# Patient Record
Sex: FEMALE | Race: WHITE
[De-identification: names, ages, dates, MRNs, and addresses within clinical notes are randomized per-mention and may not be internally consistent; named-entity substitution may affect disease eponyms.]

---

## 2019-01-07 ENCOUNTER — HOSPITAL ENCOUNTER (OUTPATIENT)
Dept: HOSPITAL 75 - RAD | Age: 19
End: 2019-01-07
Attending: UROLOGY
Payer: COMMERCIAL

## 2019-01-07 DIAGNOSIS — N20.1: Primary | ICD-10-CM

## 2019-01-07 PROCEDURE — 74018 RADEX ABDOMEN 1 VIEW: CPT

## 2019-01-07 NOTE — DIAGNOSTIC IMAGING REPORT
INDICATION: Right ureteral stone.



TIME OF EXAMINATION: 12:56 p.m.



COMPARISON: No prior studies are available for comparison.



FINDINGS: No definite radiopaque renal calculi are seen. No

definite radiopaque calculi along the course of the ureters is

seen. The bowel gas pattern is unremarkable.



IMPRESSION: Unremarkable abdominal radiograph. No definite

radiopaque urinary tract calculi are identified.



Dictated by: 



  Dictated on workstation # [id number]

## 2020-09-02 ENCOUNTER — HOSPITAL ENCOUNTER (OUTPATIENT)
Dept: HOSPITAL 75 - RAD | Age: 20
End: 2020-09-02
Attending: UROLOGY
Payer: COMMERCIAL

## 2020-09-02 DIAGNOSIS — Z87.442: ICD-10-CM

## 2020-09-02 DIAGNOSIS — N20.1: Primary | ICD-10-CM

## 2020-09-02 PROCEDURE — 74018 RADEX ABDOMEN 1 VIEW: CPT

## 2020-09-02 NOTE — DIAGNOSTIC IMAGING REPORT
INDICATION: 

History of renal calculi. History of right flank pain.



COMPARISON: 

01/07/2019.



FINDINGS: 

Two supine radiographic views of the abdomen were obtained. No

unexpected extraosseous calcifications or radiopaque foreign

bodies are seen. The small bowel loops are nondistended. There is

no large collection of free intraperitoneal air. The included

portions of the lung bases are clear. The osseous structures show

no acute abnormalities.



IMPRESSION:

1. No unexpected extraosseous calcifications.

2. Nonobstructed small bowel gas pattern.



Dictated by: 



  Dictated on workstation # [id number]

## 2020-09-03 ENCOUNTER — HOSPITAL ENCOUNTER (OUTPATIENT)
Dept: HOSPITAL 75 - PREOP | Age: 20
End: 2020-09-03
Attending: UROLOGY
Payer: COMMERCIAL

## 2020-09-03 VITALS — WEIGHT: 293 LBS | BODY MASS INDEX: 41.02 KG/M2 | HEIGHT: 70.98 IN

## 2020-09-03 DIAGNOSIS — Z01.818: Primary | ICD-10-CM

## 2020-09-09 ENCOUNTER — HOSPITAL ENCOUNTER (OUTPATIENT)
Dept: HOSPITAL 75 - SDC | Age: 20
Discharge: HOME | End: 2020-09-09
Attending: UROLOGY
Payer: COMMERCIAL

## 2020-09-09 VITALS — SYSTOLIC BLOOD PRESSURE: 81 MMHG | DIASTOLIC BLOOD PRESSURE: 56 MMHG

## 2020-09-09 VITALS — DIASTOLIC BLOOD PRESSURE: 67 MMHG | SYSTOLIC BLOOD PRESSURE: 112 MMHG

## 2020-09-09 VITALS — SYSTOLIC BLOOD PRESSURE: 108 MMHG | DIASTOLIC BLOOD PRESSURE: 70 MMHG

## 2020-09-09 VITALS — DIASTOLIC BLOOD PRESSURE: 70 MMHG | SYSTOLIC BLOOD PRESSURE: 117 MMHG

## 2020-09-09 VITALS — DIASTOLIC BLOOD PRESSURE: 92 MMHG | SYSTOLIC BLOOD PRESSURE: 129 MMHG

## 2020-09-09 VITALS — DIASTOLIC BLOOD PRESSURE: 97 MMHG | SYSTOLIC BLOOD PRESSURE: 127 MMHG

## 2020-09-09 VITALS — DIASTOLIC BLOOD PRESSURE: 67 MMHG | SYSTOLIC BLOOD PRESSURE: 114 MMHG

## 2020-09-09 VITALS — DIASTOLIC BLOOD PRESSURE: 68 MMHG | SYSTOLIC BLOOD PRESSURE: 109 MMHG

## 2020-09-09 VITALS — DIASTOLIC BLOOD PRESSURE: 72 MMHG | SYSTOLIC BLOOD PRESSURE: 120 MMHG

## 2020-09-09 VITALS — SYSTOLIC BLOOD PRESSURE: 127 MMHG | DIASTOLIC BLOOD PRESSURE: 97 MMHG

## 2020-09-09 VITALS — WEIGHT: 293 LBS | BODY MASS INDEX: 41.02 KG/M2 | HEIGHT: 70.98 IN

## 2020-09-09 VITALS — SYSTOLIC BLOOD PRESSURE: 102 MMHG | DIASTOLIC BLOOD PRESSURE: 72 MMHG

## 2020-09-09 DIAGNOSIS — Z79.4: ICD-10-CM

## 2020-09-09 DIAGNOSIS — Z88.7: ICD-10-CM

## 2020-09-09 DIAGNOSIS — K21.9: ICD-10-CM

## 2020-09-09 DIAGNOSIS — E11.9: ICD-10-CM

## 2020-09-09 DIAGNOSIS — E66.01: ICD-10-CM

## 2020-09-09 DIAGNOSIS — N20.1: Primary | ICD-10-CM

## 2020-09-09 DIAGNOSIS — K76.0: ICD-10-CM

## 2020-09-09 PROCEDURE — 82962 GLUCOSE BLOOD TEST: CPT

## 2020-09-09 PROCEDURE — 87081 CULTURE SCREEN ONLY: CPT

## 2020-09-09 PROCEDURE — 74018 RADEX ABDOMEN 1 VIEW: CPT

## 2020-09-09 PROCEDURE — 76000 FLUOROSCOPY <1 HR PHYS/QHP: CPT

## 2020-09-09 PROCEDURE — 84703 CHORIONIC GONADOTROPIN ASSAY: CPT

## 2020-09-09 NOTE — DISCHARGE INST-UROLOGY
Discharge Inst-Urology


Reconcile Patient Problems


Problems Reviewed?:  Yes


Final Diagnosis


RT URETERAL STONE





Patient Instructions/Follow Up


Plan/Assessment/Instructions


Please make appointment to been seen in office in 2 weeks.





Increase oral fluids for 48 hours and then as needed.





Diet and Activity as tolerated.





If questions or concerns contact your physician 


Or seek help at emergency department.











TAWIL,ELIAS A MD                Sep 9, 2020 09:29

## 2020-09-09 NOTE — PROGRESS NOTE-PRE OPERATIVE
Pre-Operative Progress Note


H&P Reviewed


The H&P was reviewed, patient examined and no changes noted.


Date Seen by Provider:  Sep 9, 2020


Time Seen by Provider:  09:18


Date H&P Reviewed:  Sep 9, 2020


Time H&P Reviewed:  09:18


Pre-Operative Diagnosis:  RT URETERAL STONE











TAWIL,ELIAS A MD                Sep 9, 2020 07:18

## 2020-09-09 NOTE — DIAGNOSTIC IMAGING REPORT
INDICATION: Right renal stone.



Time of exam 8:55 AM



Correlation is made with prior KUB from 09/02/2020.



Mildly prominent small bowel loop in the left abdomen is noted,

nonspecific. No definite radiopaque urinary tract calculi are

detected. No free air is identified.



IMPRESSION: No definite radiopaque urinary tract calculi are

identified.



Dictated by: 



  Dictated on workstation # [id number]

## 2020-09-09 NOTE — OPERATIVE REPORT
DATE OF SERVICE:  09/09/2020



PREOPERATIVE DIAGNOSIS:

Right proximal ureteral stone.



POSTOPERATIVE DIAGNOSIS:

Right proximal ureteral stone.



OPERATION PERFORMED:

Right ureteroscopy, insertion of right ureteral catheter, retrograde urogram and

right ESWL.



SURGEON:

Elias Tawil, MD



ANESTHESIA:

General.



COMPLICATIONS:

None.



DESCRIPTION OF PROCEDURE:

Under satisfactory general anesthesia, the patient in lithotomy position on the

cystoscopy table, genitalia were prepped and draped in the usual sterile

fashion.  Noted a vaginal prolapse.  Cystoscope was introduced under vision. 

The bladder was essentially normal.  There was a sluggish efflux on the right

side.  Using the foroblique lens, I dilated the right ureteral orifice

intramural portion to accommodate a 6.9 German semirigid ureteroscope; however,

I could not go beyond the proximal ureter at the L2.  I injected contrast and I

could see the filling defect that originally was in the pelvis of the kidney at

the UPJ pushed up into the upper calyx.  I removed the ureteroscope, inserted

the cystoscope again and passed a ureteral catheter all the way to the proximal

ureter just below the UPJ and again confirmed the filling defect.  We moved the

patient to the ESWL table supine, and we localized the stone guided by injection

of contrast, delivered shocks at kV of 6.  After 2000 shocks delivered, we could

not see any more filling defect after one or two injection of contrast.  We went

ahead and removed the ureteral catheter and the contrast was flowing very nicely

and strongly.  The patient received 40 mg of Lasix and 30 mg of Toradol IV at

the end of the procedure.  She tolerated the procedure and anesthesia well and

was sent to recovery room in stable condition.





Job ID: [id number]

DocumentID: [id number]

Dictated Date:  09/09/2020 10:34:03

Transcription Date: 09/09/2020 12:51:48

Dictated By: ELIAS TAWIL, MD

## 2020-09-09 NOTE — PROGRESS NOTE-POST OPERATIVE
Post-Operative Progess Note


Surgeon (s)/Assistant (s)


Surgeon


ELIAS TAWIL MD


Assistant:  NONE





Pre-Operative Diagnosis


RT URETERAL STONE





Post-Operative Diagnosis





SAME





Procedure & Operative Findings


Date of Procedure


9/9/20


Procedure Performed/Findings


RT URETEROSCOPY, INSERTION OF RT URETERAL CATHETER, RETROGRADE UROGRAM AND RT 

ESWL


Anesthesia Type


GENERAL





Estimated Blood Loss


Estimated blood loss (mL):  NONE





Specimens/Packing


Specimens Removed


NONE


Packing:  


NONE











TAWIL,ELIAS A MD                Sep 9, 2020 09:28

## 2020-09-09 NOTE — ANESTHESIA-GENERAL POST-OP
General


Patient Condition


Mental Status/LOC:  Same as Preop


Cardiovascular:  Satisfactory


Nausea/Vomiting:  Absent


Respiratory:  Satisfactory


Pain:  Controlled


Complications:  Absent





Post Op Complications


Complications


None





Follow Up Care/Instructions


Patient Instructions


None needed.





Anesthesia/Patient Condition


Patient Condition


Patient is doing well, no complaints, stable vital signs, no apparent adverse 

anesthesia problems.   


No complications reported per nursing.











ZI SIMON CRNA             Sep 9, 2020 10:43